# Patient Record
Sex: FEMALE | Race: WHITE | NOT HISPANIC OR LATINO | URBAN - METROPOLITAN AREA
[De-identification: names, ages, dates, MRNs, and addresses within clinical notes are randomized per-mention and may not be internally consistent; named-entity substitution may affect disease eponyms.]

---
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
## 2020-12-01 ENCOUNTER — RECORDS - HEALTHEAST (OUTPATIENT)
Dept: ADMINISTRATIVE | Facility: OTHER | Age: 20
End: 2020-12-01

## 2021-05-30 VITALS — WEIGHT: 117.13 LBS

## 2021-05-30 NOTE — PATIENT INSTRUCTIONS - HE
1. Come back in around 2 weeks once you've made the change to the lamictal to 100mg twice a day. You can go directly to the lab and get the labs drawn. We will send these results to your neurologist.

## 2021-05-30 NOTE — TELEPHONE ENCOUNTER
Parent Adriano is phoned with paperwork question on form for guardianship. Md states patient is p[hysically able to attend hearing. Dad states she is mentally incapable. Clarification is made

## 2021-05-30 NOTE — TELEPHONE ENCOUNTER
Guardianship paperwork is faxed to Sharonda Aragon. Copy is made for medical records. Copy is mailed to parents

## 2021-05-30 NOTE — PROGRESS NOTES
[facility] Child Check    ASSESSMENT & PLAN  Korin Cristina is a 18 y.o. who has abnormal growth: short stature and abnormal development:  blindness.    Diagnoses and all orders for this visit:    Nonintractable epilepsy without status epilepticus, unspecified epilepsy type (H)  Seizure free for 5 years on lamictal. Per notes from neurologist, plan is for them to change her dosing from lamictal 50mg in AM, 50mg at noon, and 100mg at bedtime to 100mg two times a day. Neurologist Dr. Francesco Alvarez wants CBC, CMP, and lamictal level 1 week after dose change. She is going to summer school for next week, then they will make dose adjustment week after. I will put standing orders in and they can get labs done about a week after dose change so that the lamictal level is accurate. Then we will fax results to [facility] to Francesco Alvarez.   -     HM1(CBC and Differential); Future; Expected date: 07/23/2019  -     Comprehensive Metabolic Panel; Future; Expected date: 07/23/2019  -     Lamotrigine (Lamictal  ); Future; Expected date: 07/23/2019  -     lamoTRIgine (LAMICTAL) 25 MG tablet; 50mg in the morning, 50mg at noon, and 100mg before bedtime.    Hypothyroidism, unspecified type  [facility] ANTHONY filled out since we have not received anything from their clinic in last several years. Follows with them two times a year per Dad.  -     levothyroxine (SYNTHROID, LEVOTHROID) 75 MCG tablet; Take 1 tablet (75 mcg total) by mouth Daily at 6:00 am.; Refill: 2    Hypopituitarism  Short Stature (On Exam)  Previously on growth hormone treatment, now just on hydrocortisone and synthroid per above. No recent changes to dosing.  -     hydrocortisone (CORTEF) 5 MG tablet; Take 2.25 tab in morning, 1.75 tab at noon, 1 tab at 5pm, and 2 tabs at 11pm. Triple the dose for stress.; Refill: 0    Optic nerve hypoplasia of both eyes  Follows with Optho twice a year.    Primary amenorrhea  Per Dad has never  had period. Did not have withdrawal bleed on Provera. Needs to follow up with [facility]. Dad will check on Mom about timing of follow up. ANTHONY filled out to get records.    Encounter for routine child health examination with abnormal findings  Discussed cervical cancer screening starting at age 21 given she is vulnerable adult. We briefly discussed romantic interests, but Dad states she has not had any thoughts of that yet and has not had any romantic relationships. Plans to remain resident at [facility] up until age 21, then Dad is thinking transitioning to group home. They do not have definitive plans yet but have started thinking about this already.       Return to clinic in 1 year for a Well Child Check or sooner as needed    IMMUNIZATIONS/LABS  No immunizations due today.    REFERRALS  Dental:  Recommend routine dental care as appropriate.  Other:  No additional referrals were made at this time.    ANTICIPATORY GUIDANCE  I have reviewed age appropriate anticipatory guidance.     Nataliya Zendejas MD  Internal Medicine and Pediatrics  [facility]  Pager [phone]      HEALTH HISTORY  Do you have any concerns that you'd like to discuss today?: needs blood work    1.  Epilepsy  The patient follows with [facility].  She was just seen on June 13, 2019.  At that time the decision was made to change her Lamictal from 50 mg in the morning, 50 mg at noon, and 100 mg at bedtime to 100 mg in the morning and 100 mg at night.  After the dose change, her neurologist wanted her to get labs drawn about a week after.  They have not made the dose change yet.  She is a resident at the [employer] and she will be attending their summer school session for the next week.  When she returns from summer school parents will then make the change in dosing of the Lamictal.  They do not want to make any changes in case she has any breakthrough  seizures.  She has been seizure-free for 5 years.    2.  Hypopituitarism  [facility]  She was just seen a couple months ago.  There have not been any recent changes to her medications.  She is on Synthroid 75 mcg daily for hypothyroidism and she is also taking hydrocortisone for adrenal insufficiency.  She previously was on growth hormone treatment for short stature, however it was not effective and so they stopped treatment for that.    3.  Amenorrhea  The patient has never had a period before.  She was seen by the gynecology team at [facility] within the last year.  She was on Provera to trigger a withdrawal bleed, however she did not get any periods on it.  They were supposed to follow-up with gynecology, however dad is not sure about the timing of the follow-up.  He will need to talk to his wife about it.    4.  Optic nerve hypoplasia  The patient follows with ophthalmology twice a year.    She has a PCA.  She is always doing something active with her PCA, such as visiting the signs of eczema going walks or going swimming.  Dad reports that she will be a resident of the [employer] [facility] until age 21, and then afterward they are likely going to transition her to her group home.  Parents have many adopted children with similar special needs, and reports that    Roomed by: Milena        Do you have any significant health concerns in your family history?: Yes  Family History   Adopted: Yes   Problem Relation Age of Onset     Drug abuse Mother      Drug abuse Father      Autism Brother      ADD / ADHD Brother      Since your last visit, have there been any major changes in your family, such as a move, job change, separation, divorce, or death in the family?: No  Has a lack of transportation kept you from medical appointments?: No    Home  Who lives in your home?:  Mother father 7 sisters, 3 brothers  Social History     Social History Narrative    Lives  with mother, father, sharif,tate,horace,eleno,sunday,jabari,loretta, and yaneth    Attends [employer].    Sonia Carmona     Do you have any concerns about losing your housing?: No  Is your housing safe and comfortable?: Yes  Do you have any trouble with sleep?:  No    Education  What school do you child attend?:  [employer]  What grade are you in?:  12th  How do you perform in school (grades, behavior, attention, homework?: Good     Eating  Do you eat regular meals including fruits and vegetables?:  yes  What are you drinking (cow's milk, water, soda, juice, sports drinks, energy drinks, etc)?: cow's milk- 1% and water  Have you been worried that you don't have enough food?: No  Do you have concerns about your body or appearance?:  No    Activities  Do you have friends?:  yes  Do you get at least one hour of physical activity per day?:  yes  How many hours a day are you in front of a screen other than for schoolwork (computer, TV, phone)?:  blind  What do you do for exercise?:  Walk, swimming,   Do you have interest/participate in community activities/volunteers/school sports?:  no    MENTAL HEALTH SCREENING  PHQ-2 Total Score: 0 (7/9/2019  8:04 AM)    PHQ-9 Total Score: 0 (7/9/2019  8:04 AM)      VISION/HEARING  Vision: blind  Hearing:  Completed. See Results    No exam data present    TB Risk Assessment:  The patient and/or parent/guardian answer positive to:  has been in contact with someone known to have TB    Dyslipidemia Risk Screening  Have either of your parents or any of your grandparents had a stroke or heart attack before age 55?: Adopted  Any parents with high cholesterol or currently taking medications to treat?: Adopted     Dental  When was the last time you saw the dentist?: 6-12 months ago    Patient Active Problem List   Diagnosis     Hypopituitarism     Epilepsy And Recurrent Seizures     Optic Nerve Hypoplasia Of Both Eyes     Short Stature (On Exam)      "Penicillin Reaction     Hypothyroidism       Drugs  Does the patient use tobacco/alcohol/drugs?:  no    Safety  Does the patient have any safety concerns (peer or home)?:  Yes, but not in the home; never home alone    Sex   Have you ever had sex?:  No    MEASUREMENTS  Height:  4' 6\" (1.372 m)  Weight: 129 lb 8 oz (58.7 kg)  BMI: Body mass index is 31.22 kg/m .  Blood Pressure: 112/68  Blood pressure percentiles are not available for patients who are 18 years or older.    PHYSICAL EXAM  /68 (Patient Site: Right Arm, Patient Position: Sitting, Cuff Size: Adult Regular)   Pulse 86   Resp 16   Ht 4' 6\" (1.372 m)   Wt 129 lb 8 oz (58.7 kg)   SpO2 97%   BMI 31.22 kg/m      General Appearance:    Alert, cooperative, no distress   Head:    Normocephalic, without obvious abnormality, atraumatic   Eyes:    PERRL, conjunctiva/corneas clear   Ears:    Normal TM's and external ear canals, both ears   Nose:   Nares normal, septum midline, mucosa normal   Throat:   Lips, mucosa, and tongue normal; teeth and gums normal   Neck:   Supple, symmetrical, trachea midline, no adenopathy   Back:     Symmetric   Lungs:     Clear to auscultation bilaterally, respirations unlabored    Heart:    Regular rate and rhythm, S1 and S2 normal, no murmur, rub    or gallop   Abdomen:     Soft, non-tender, bowel sounds active all four quadrants,     no masses, no organomegaly   Extremities:   Extremities normal, atraumatic, no cyanosis or edema   Pulses:   2+ radial pulse   Skin:   Skin color, texture, turgor normal, no rashes or lesions   Neurologic:   Normal strength and sensation throughout grossly, reflexes normal       "

## 2021-06-01 VITALS — BODY MASS INDEX: 32.68 KG/M2 | HEIGHT: 53 IN | WEIGHT: 131.3 LBS

## 2021-06-03 VITALS — BODY MASS INDEX: 31.3 KG/M2 | WEIGHT: 129.5 LBS | HEIGHT: 54 IN

## 2021-06-09 NOTE — PROGRESS NOTES
SUBJECTIVE:  Korin Cristina is a 16 y.o. female presents with father with 1 weeks complaint of rash. Rash was first noticed on her face and then her legs. Some Days with no rash noticed, once they gave some Zyrtec and Benadryl, which gave relief. Dad has not noticed any rash today. Dad describes the rash as been red and flat, maybe slightly raised area, different sizes and moving around the body. Rash was pruritic when it was present. Rash was not weeping or discharging. She also complains of some mild congestion.  Denies cough, sore throat, stomach ache, N/V/D. No contacts with similar rash. She has not had exposure to new soaps, cosmetics, detergents, fabric softners, animals, plants or anything else that could have caused this rash.  She has hx of sensitive skin.        Past Medical History:   Diagnosis Date     Epilepsy      Hypopituitarism      Hypothyroidism      Optic nerve hypoplasia     legally blind     Short stature (child)        Current Medications:  Current Outpatient Prescriptions on File Prior to Visit   Medication Sig Dispense Refill     hydrocortisone (CORTEF) 5 MG tablet Take 5 mg by mouth 4 (four) times a day. TAKE 1 TABLET 4 TIMES DAILY doubled or tripled with illness       lamoTRIgine (LAMICTAL) 25 MG tablet Take 1 tablet (25 mg total) by mouth 2 (two) times a day. 3 TABS TWICE DAILY       levothyroxine (SYNTHROID, LEVOTHROID) 75 MCG tablet   2     diazepam (DIASTAT) 2.5 mg Kit Insert into the rectum.       diazepam 12.5-15-17.5-20 mg Kit Insert 20 mg into the rectum. Gel       HYDROCORTISONE SOD SUCCINATE (SOLU-CORTEF INJ) Solu-CORTEF SOLR       levothyroxine (SYNTHROID, LEVOTHROID) 50 MCG tablet Take 50 mcg by mouth daily.       medroxyPROGESTERone (PROVERA) 5 MG tablet   0     MELATONIN ORAL Take by mouth. TAKE AS DIRECTED.       POLYETHYLENE GLYCOL 3350 (GLYCOLAX ORAL) Take by mouth. MIX 1 CAPFUL (17 GRAMS) IN 8 OZ. OF FLUID Q D PRN FOR CONSTIPATION       somatropin (NUTROPIN AQ) 10  mg/2 mL (5 mg/mL) Soln Inject 0.45 mg under the skin. 0.45 mg daily by subcu injection       No current facility-administered medications on file prior to visit.         Allergies:  Allergies   Allergen Reactions     Penicillins      hives on amoxicillin 5/07           OBJECTIVE:    /70  Pulse 58  Temp 98  F (36.7  C) (Oral)   Wt 117 lb 2 oz (53.1 kg)  SpO2 97%  Breastfeeding? No    Physical exam reveals a pleasant 16 y.o. female.   Appears healthy, alert and cooperative.  Eyes:bilateral conjunctiva clear free of injection. Lids normal.  Ears: bilateral TMs pearly grey, landmarks visualized.   Nasopharynx: pink and moist  Oropharynx:  tonsillar hypertrophy +1, erythema, no exudate. Uvula midline. Posterior pharynx erythematous. Mucus membranes pink and moist, free of lesions.  Neck: normal and no adenopathy  Lungs: Chest is clear, no wheezing or rales. Symmetric air entry throughout both lung fields.  Heart: regular rate and rhythm, no murmur, rub or gallop  Skin: No current rash seen. Does have some excoriation marks on her neck, chest and extremities from itching. No signs of secondary infection.    1. Rash  Rapid Strep A Screen-Throat    Group A Strep, RNA Direct Detection, Throat        I reviewed exam and lab findings with dad. Will contact parents in next 24-48 hours is strep confirmatory test positive, would prescribe antibiotics at that time. Patient does not currently have a rash. Given description of rash and positive response to zyrtec and Benadryl, supports Urticaria. Discussed etiology and treatment of hives. Want to avoid any irritants that have been identified. May continue to use Zyrtec 10 mg daily or Benadryl prn for future outbreak. If rash should reappear and stay unresolved, or if onset of more acute symptoms, should return to PCP or WIC for further evaluation. Dad verbalized understanding and agrees with plan of care.    -Patient instructions given.

## 2021-06-16 PROBLEM — N91.0 PRIMARY AMENORRHEA: Status: ACTIVE | Noted: 2019-07-09

## 2021-06-19 NOTE — PROGRESS NOTES
[facility] Well Child Check    ASSESSMENT & PLAN  Korin Cristina is a 17  y.o. 8  m.o. 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 She also has secondary amenorrhea. She is followed by [facility] for her seizure disorder, [facility], and [facility] at [facility] (for the secondary amenorrhea-the work up for this is underway). She also has overweight which is being addressed through encouraging her to eat lower calorie foods at home and at school.  She is doing well today and she was cleared for summer camp.    Diagnoses and all orders for this visit:    Encounter for routine child health examination with abnormal findings  -     Meningococcal MCV4P  -     Hearing Screening  -     Hepatitis A vaccine Ped/Adol 2 dose IM (18yr & under)    Hypopituitarism    Epilepsy (H)    Optic nerve hypoplasia of both eyes        Return to clinic in 1 year for a Well Child Check or sooner as needed    IMMUNIZATIONS/LABS  Immunizations were reviewed and orders were placed as appropriate.    REFERRALS  Dental:  The patient has already established care with a dentist.  Other:  No additional referrals were made at this time.    ANTICIPATORY GUIDANCE  I have reviewed age appropriate anticipatory guidance.    HEALTH HISTORY  Do you have any concerns that you'd like to discuss today?: No concerns       Accompanied by Other Mother is here with Dr. Gan, Sister is here       Do you have any significant health concerns in your family history?: No  Family History   Problem Relation Age of Onset     Adopted: Yes     Drug abuse Mother      Drug abuse Father      Autism Brother      ADD / ADHD Brother      Since your last visit, have there been any major changes in your family, such as a move, job change, separation, divorce, or death in the family?: No  Has a lack of transportation kept you from medical  appointments?: No    Home  Who lives in your home?:  Mother, Father, 8 sisters, 3 Brothers  Social History     Social History Narrative    Lives with mother, father, sharif,tate,horace,eleno,sunday,jabari,loretta, and yaneth    Attends [employer].    Sonia Carmona         Do you have any concerns about losing your housing?: No  Is your housing safe and comfortable?: Yes  Do you have any trouble with sleep?:  No    Education  What school do you child attend?:  [employer]  What grade are you in?:  11th  How do you perform in school (grades, behavior, attention, homework?: Good does have IEP     Eating  Do you eat regular meals including fruits and vegetables?:  yes  What are you drinking (cow's milk, water, soda, juice, sports drinks, energy drinks, etc)?: cow's milk- 1% and water  Have you been worried that you don't have enough food?: No  Do you have concerns about your body or appearance?:  No  She is getting lower calorie foods to help with her weight loss, but not in a restrictive way.    Activities  Do you have friends?:  yes  Do you get at least one hour of physical activity per day?:  yes  How many hours a day are you in front of a screen other than for schoolwork (computer, TV, phone)?:  0  What do you do for exercise?:  Walk  Do you have interest/participate in community activities/volunteers/school sports?:  Yes in Choir    MENTAL HEALTH SCREENING  PHQ-2 Total Score: 0 (7/6/2018  9:00 AM)  Depression Follow-up Plan: patient follow-up to return when and if necessary (7/6/2018  9:00 AM)  PHQ-9 Total Score: 0 (7/6/2018  9:00 AM)    VISION/HEARING  Vision: Not done: Patient is legally Blind  Hearing:  Completed. See Results     Hearing Screening    125Hz 250Hz 500Hz 1000Hz 2000Hz 3000Hz 4000Hz 6000Hz 8000Hz   Right ear:   25 20 20 20 20 20    Left ear:   20 20 20 20 20 20        TB Risk Assessment:  The patient and/or parent/guardian answer positive to:  patient and/or  "parent/guardian answer 'no' to all screening TB questions    Dyslipidemia Risk Screening  Have either of your parents or any of your grandparents had a stroke or heart attack before age 55?: No  Any parents with high cholesterol or currently taking medications to treat?: No     Dental  When was the last time you saw the dentist?: Sister does not know      Patient Active Problem List   Diagnosis     Hypopituitarism     Epilepsy And Recurrent Seizures     Optic Nerve Hypoplasia Of Both Eyes     Short Stature (On Exam)     Penicillin Reaction     Hypothyroidism       Drugs  Does the patient use tobacco/alcohol/drugs?:  no    Safety  Does the patient have any safety concerns (peer or home)?:  no  Does the patient use safety belts, helmets and other safety equipment?:  no    Sex  Have you ever had sex?:  No    MEASUREMENTS  Height:  4' 4.5\" (1.334 m)  Weight: 131 lb 4.8 oz (59.6 kg)  BMI: Body mass index is 33.49 kg/(m^2).  Blood Pressure: 100/60  Blood pressure percentiles are 21 % systolic and 33 % diastolic based on NHBPEP's 4th Report. Blood pressure percentile targets: 90: 122/79, 95: 126/83, 99 + 5 mmH/95.    PHYSICAL EXAM  Physical Exam   Constitutional: She is well-developed, well-nourished, and in no distress.   Korin shakes her head back and forth at times.   HENT:   Head: Normocephalic and atraumatic.   Right Ear: External ear normal.   Left Ear: External ear normal.   Mouth/Throat: Oropharynx is clear and moist.   Eyes: Conjunctivae are normal. Pupils are equal, round, and reactive to light.   Neck: Normal range of motion. Neck supple.   Cardiovascular: Normal rate, regular rhythm and normal heart sounds.    Pulmonary/Chest: Effort normal and breath sounds normal.   Abdominal: Soft. Bowel sounds are normal. She exhibits no mass. There is no tenderness. There is no guarding.   Musculoskeletal: Normal range of motion.   Neurological: She is alert. Gait normal.   Skin: Skin is warm and dry.     "